# Patient Record
Sex: MALE | Race: WHITE
[De-identification: names, ages, dates, MRNs, and addresses within clinical notes are randomized per-mention and may not be internally consistent; named-entity substitution may affect disease eponyms.]

---

## 2018-03-19 ENCOUNTER — HOSPITAL ENCOUNTER (INPATIENT)
Dept: HOSPITAL 96 - M.ERS | Age: 63
LOS: 3 days | Discharge: HOME | DRG: 286 | End: 2018-03-22
Attending: INTERNAL MEDICINE | Admitting: INTERNAL MEDICINE
Payer: COMMERCIAL

## 2018-03-19 VITALS — BODY MASS INDEX: 38.5 KG/M2 | WEIGHT: 275 LBS | HEIGHT: 70.98 IN

## 2018-03-19 VITALS — DIASTOLIC BLOOD PRESSURE: 65 MMHG | SYSTOLIC BLOOD PRESSURE: 140 MMHG

## 2018-03-19 VITALS — SYSTOLIC BLOOD PRESSURE: 160 MMHG | DIASTOLIC BLOOD PRESSURE: 64 MMHG

## 2018-03-19 DIAGNOSIS — N18.2: ICD-10-CM

## 2018-03-19 DIAGNOSIS — Z95.1: ICD-10-CM

## 2018-03-19 DIAGNOSIS — I48.0: ICD-10-CM

## 2018-03-19 DIAGNOSIS — J44.9: ICD-10-CM

## 2018-03-19 DIAGNOSIS — I24.9: ICD-10-CM

## 2018-03-19 DIAGNOSIS — I25.5: ICD-10-CM

## 2018-03-19 DIAGNOSIS — I13.0: ICD-10-CM

## 2018-03-19 DIAGNOSIS — G47.33: ICD-10-CM

## 2018-03-19 DIAGNOSIS — I10: ICD-10-CM

## 2018-03-19 DIAGNOSIS — F17.210: ICD-10-CM

## 2018-03-19 DIAGNOSIS — F32.9: ICD-10-CM

## 2018-03-19 DIAGNOSIS — Z79.899: ICD-10-CM

## 2018-03-19 DIAGNOSIS — I50.33: ICD-10-CM

## 2018-03-19 DIAGNOSIS — I25.110: Primary | ICD-10-CM

## 2018-03-19 LAB
ABSOLUTE BASOPHILS: 0 THOU/UL (ref 0–0.2)
ABSOLUTE EOSINOPHILS: 0.1 THOU/UL (ref 0–0.7)
ABSOLUTE MONOCYTES: 0.3 THOU/UL (ref 0–1.2)
ALBUMIN SERPL-MCNC: 3.8 G/DL (ref 3.4–5)
ALP SERPL-CCNC: 63 U/L (ref 46–116)
ALT SERPL-CCNC: 33 U/L (ref 30–65)
ANION GAP SERPL CALC-SCNC: 8 MMOL/L (ref 7–16)
AST SERPL-CCNC: 21 U/L (ref 15–37)
BASOPHILS NFR BLD AUTO: 0.5 %
BILIRUB SERPL-MCNC: 0.3 MG/DL
BUN SERPL-MCNC: 19 MG/DL (ref 7–18)
CALCIUM SERPL-MCNC: 9.5 MG/DL (ref 8.5–10.1)
CHLORIDE SERPL-SCNC: 103 MMOL/L (ref 98–107)
CO2 SERPL-SCNC: 28 MMOL/L (ref 21–32)
CREAT SERPL-MCNC: 1.3 MG/DL (ref 0.6–1.3)
EOSINOPHIL NFR BLD: 1.6 %
GLUCOSE SERPL-MCNC: 248 MG/DL (ref 70–99)
GRANULOCYTES NFR BLD MANUAL: 78.1 %
HCT VFR BLD CALC: 43.5 % (ref 42–52)
HGB BLD-MCNC: 14.7 GM/DL (ref 14–18)
INR PPP: 1
LYMPHOCYTES # BLD: 1.4 THOU/UL (ref 0.8–5.3)
LYMPHOCYTES NFR BLD AUTO: 16.4 %
MCH RBC QN AUTO: 32.2 PG (ref 26–34)
MCHC RBC AUTO-ENTMCNC: 33.7 G/DL (ref 28–37)
MCV RBC: 95.5 FL (ref 80–100)
MONOCYTES NFR BLD: 3.4 %
MPV: 9.1 FL. (ref 7.2–11.1)
NEUTROPHILS # BLD: 6.7 THOU/UL (ref 1.6–8.1)
NT-PRO BRAIN NAT PEPTIDE: 70 PG/ML (ref ?–300)
NUCLEATED RBCS: 0 /100WBC
PLATELET COUNT*: 168 THOU/UL (ref 150–400)
POTASSIUM SERPL-SCNC: 3.6 MMOL/L (ref 3.5–5.1)
PROT SERPL-MCNC: 7.5 G/DL (ref 6.4–8.2)
PROTHROMBIN TIME: 10 SECONDS (ref 9.2–11.5)
RBC # BLD AUTO: 4.56 MIL/UL (ref 4.5–6)
RDW-CV: 14 % (ref 10.5–14.5)
SODIUM SERPL-SCNC: 139 MMOL/L (ref 136–145)
TROPONIN-I LEVEL: <0.06 NG/ML (ref ?–0.06)
WBC # BLD AUTO: 8.6 THOU/UL (ref 4–11)

## 2018-03-20 VITALS — SYSTOLIC BLOOD PRESSURE: 148 MMHG | DIASTOLIC BLOOD PRESSURE: 68 MMHG

## 2018-03-20 VITALS — SYSTOLIC BLOOD PRESSURE: 126 MMHG | DIASTOLIC BLOOD PRESSURE: 57 MMHG

## 2018-03-20 VITALS — SYSTOLIC BLOOD PRESSURE: 133 MMHG | DIASTOLIC BLOOD PRESSURE: 66 MMHG

## 2018-03-20 VITALS — DIASTOLIC BLOOD PRESSURE: 64 MMHG | SYSTOLIC BLOOD PRESSURE: 126 MMHG

## 2018-03-20 VITALS — SYSTOLIC BLOOD PRESSURE: 126 MMHG | DIASTOLIC BLOOD PRESSURE: 72 MMHG

## 2018-03-20 VITALS — DIASTOLIC BLOOD PRESSURE: 66 MMHG | SYSTOLIC BLOOD PRESSURE: 123 MMHG

## 2018-03-20 VITALS — SYSTOLIC BLOOD PRESSURE: 141 MMHG | DIASTOLIC BLOOD PRESSURE: 61 MMHG

## 2018-03-20 VITALS — DIASTOLIC BLOOD PRESSURE: 59 MMHG | SYSTOLIC BLOOD PRESSURE: 128 MMHG

## 2018-03-20 VITALS — SYSTOLIC BLOOD PRESSURE: 139 MMHG | DIASTOLIC BLOOD PRESSURE: 67 MMHG

## 2018-03-20 VITALS — SYSTOLIC BLOOD PRESSURE: 128 MMHG | DIASTOLIC BLOOD PRESSURE: 48 MMHG

## 2018-03-20 VITALS — SYSTOLIC BLOOD PRESSURE: 142 MMHG | DIASTOLIC BLOOD PRESSURE: 70 MMHG

## 2018-03-20 VITALS — SYSTOLIC BLOOD PRESSURE: 140 MMHG | DIASTOLIC BLOOD PRESSURE: 66 MMHG

## 2018-03-20 VITALS — SYSTOLIC BLOOD PRESSURE: 129 MMHG | DIASTOLIC BLOOD PRESSURE: 63 MMHG

## 2018-03-20 VITALS — DIASTOLIC BLOOD PRESSURE: 66 MMHG | SYSTOLIC BLOOD PRESSURE: 14 MMHG

## 2018-03-20 VITALS — SYSTOLIC BLOOD PRESSURE: 129 MMHG | DIASTOLIC BLOOD PRESSURE: 59 MMHG

## 2018-03-20 VITALS — SYSTOLIC BLOOD PRESSURE: 135 MMHG | DIASTOLIC BLOOD PRESSURE: 66 MMHG

## 2018-03-20 VITALS — SYSTOLIC BLOOD PRESSURE: 136 MMHG | DIASTOLIC BLOOD PRESSURE: 62 MMHG

## 2018-03-20 VITALS — SYSTOLIC BLOOD PRESSURE: 130 MMHG | DIASTOLIC BLOOD PRESSURE: 67 MMHG

## 2018-03-20 VITALS — SYSTOLIC BLOOD PRESSURE: 127 MMHG | DIASTOLIC BLOOD PRESSURE: 63 MMHG

## 2018-03-20 VITALS — SYSTOLIC BLOOD PRESSURE: 130 MMHG | DIASTOLIC BLOOD PRESSURE: 70 MMHG

## 2018-03-20 LAB
ALBUMIN SERPL-MCNC: 3.7 G/DL (ref 3.4–5)
ALP SERPL-CCNC: 58 U/L (ref 46–116)
ALT SERPL-CCNC: 27 U/L (ref 30–65)
ANION GAP SERPL CALC-SCNC: 13 MMOL/L (ref 7–16)
AST SERPL-CCNC: 21 U/L (ref 15–37)
BILIRUB SERPL-MCNC: 0.3 MG/DL
BUN SERPL-MCNC: 20 MG/DL (ref 7–18)
CALCIUM SERPL-MCNC: 9.2 MG/DL (ref 8.5–10.1)
CHLORIDE SERPL-SCNC: 110 MMOL/L (ref 98–107)
CHOLEST SERPL-MCNC: 158 MG/DL (ref ?–200)
CO2 SERPL-SCNC: 23 MMOL/L (ref 21–32)
CREAT SERPL-MCNC: 1.4 MG/DL (ref 0.6–1.3)
GLUCOSE SERPL-MCNC: 112 MG/DL (ref 70–99)
HCT VFR BLD CALC: 43.1 % (ref 42–52)
HDLC SERPL-MCNC: 44 MG/DL (ref 40–?)
HGB BLD-MCNC: 14.3 GM/DL (ref 14–18)
LDLC SERPL-MCNC: 80 MG/DL (ref ?–100)
MCH RBC QN AUTO: 31.8 PG (ref 26–34)
MCHC RBC AUTO-ENTMCNC: 33.1 G/DL (ref 28–37)
MCV RBC: 96 FL (ref 80–100)
MPV: 9.1 FL. (ref 7.2–11.1)
PLATELET COUNT*: 153 THOU/UL (ref 150–400)
POTASSIUM SERPL-SCNC: 4.1 MMOL/L (ref 3.5–5.1)
PROT SERPL-MCNC: 6.7 G/DL (ref 6.4–8.2)
RBC # BLD AUTO: 4.49 MIL/UL (ref 4.5–6)
RDW-CV: 13.8 % (ref 10.5–14.5)
SODIUM SERPL-SCNC: 146 MMOL/L (ref 136–145)
TC:HDL: 3.6 RATIO
TRIGL SERPL-MCNC: 173 MG/DL (ref ?–150)
VLDLC SERPL CALC-MCNC: 35 MG/DL (ref ?–40)
WBC # BLD AUTO: 8 THOU/UL (ref 4–11)

## 2018-03-20 NOTE — EKG
[place] [address]
Phone:  [phone]                     ELECTROCARDIOGRAM REPORT      
_______________________________________________________________________________
 
Name:       CHARLOTTEHERBER MATTI                Room:           [address]    ADM IN  
M.R.#:  [id number]      Account #:      [id number]  
Admission:  18     Attend Phys:    SITA Linn
Discharge:               Date of Birth:  [birth year]  
         Report #: [id number]
    [id number]
_______________________________________________________________________________
THIS REPORT FOR:  //name//                      
 
                         [facility] ED
                                       
Test Date:    2018               Test Time:    23:01:29
Pat Name:     HERBER PORTER            Department:   
Patient ID:   [id number]            Room:         [address]
Gender:       M                        Technician:   
:          1955               Requested By: Johny Enriquez
Order Number: [id number] MD:   Sony Farr
                                 Measurements
Intervals                              Axis          
Rate:         82                       P:            35
MA:           177                      QRS:          -22
QRSD:         106                      T:            58
QT:           385                                    
QTc:          450                                    
                           Interpretive Statements
Sinus rhythm
Borderline left axis deviation
Abnormal R-wave progression, early transition
Baseline wander in lead(s) V4
Compared to ECG 2018 21:12:48
No significant changes
 
Electronically Signed On 3- 15:09:02 CDT by Sony Farr
[email or web address]
 
 
 
 
 
 
 
 
 
 
 
 
 
 
 
 
  <ELECTRONICALLY SIGNED>
                                           By: Sony Farr MD, FAC   
  18     1509
D: 18 230   _____________________________________
T: 18 230   Sony Farr MD, FAC     /EPI

## 2018-03-20 NOTE — EKG
[place] [address]
Phone:  [phone]                     ELECTROCARDIOGRAM REPORT      
_______________________________________________________________________________
 
Name:       CHARLOTTEHERBER M                Room:           [address]    ADM IN  
M.R.#:  [id number]      Account #:      [id number]  
Admission:  18     Attend Phys:    SITA Linn
Discharge:               Date of Birth:  [birth year]  
         Report #: [id number]
    [id number]
_______________________________________________________________________________
THIS REPORT FOR:  //name//                      
 
                         [facility] ED
                                       
Test Date:    2018               Test Time:    21:12:48
Pat Name:     HERBER PORTER            Department:   
Patient ID:   [id number]            Room:         [facility]
Gender:       M                        Technician:   
:          1955               Requested By: Claudette Burciaga
Order Number: [id number] MD:   Best Lange
                                 Measurements
Intervals                              Axis          
Rate:         85                       P:            87
OR:           187                      QRS:          -3
QRSD:         108                      T:            76
QT:           368                                    
QTc:          438                                    
                           Interpretive Statements
Sinus rhythm
Abnormal R-wave progression, early transition
Baseline wander in lead(s) V6
No previous ECG available for comparison
 
Electronically Signed On 3- 10:47:36 CDT by Best Lange
[email or web address]
 
 
 
 
 
 
 
 
 
 
 
 
 
 
 
 
 
 
  <ELECTRONICALLY SIGNED>
                                           By: Best Lange MD, [facility]      
  18     1047
D: 18   _____________________________________
T: 18   Best Lange MD, FAC        /EPI

## 2018-03-20 NOTE — 2DMMODE
[place] [address]
Phone:  [phone] 2 D/M-MODE ECHOCARDIOGRAM     
_______________________________________________________________________________
 
Name:         CHARLOTTEHERBER MATTI               Room:          008-P    [facility] [place] 
[facility]#:    [id number]     Account #:     [id number]  
Admission:    18    Attend Phys:   Johny Enriquez
Discharge:                Date of Birth: [birth year]  
Date of Service: 18 1434  Report #:      [id number]
        [id number]
_______________________________________________________________________________
THIS REPORT FOR:  //name//                      
 
 
--------------- APPROVED REPORT --------------
 
 
Study performed:  2018 11:15:02
 
EXAM: Comprehensive 2D, Doppler, and color-flow 
Echocardiogram 
Patient Location: In-Patient   
Room #:  008     Status:  routine
 
      BSA:         2.36
HR: 75 bpm BP:          134/67 mmHg 
Rhythm: NSR     
 
Other Information 
Study Quality: Good
 
Indications
Chest Pain
 
2D Dimensions
   LVEF(%):  68.53 (&gt;50%)
IVSd:  12.53 (7-11mm) LVOT Diam:  22.02 (18-24mm) 
LVDd:  46.40 mm  
PWd:  11.23 (7-11mm) Ascending Ao:  42.37 (22-36mm)
LVDs:  28.65 (25-40mm) 
Aortic Root:  30.44 mm 
   Saucedo's LVEF:  68.53 %
 
Volumes
Left Atrial Volume (Systole) 
    LA ESV Index:  35.30 mL/m2
 
Aortic Valve
AoV Peak Denver.:  1.27 m/s 
AO Peak Gr.:  6.46 mmHg  LVOT Max P.90 mmHg
AO Mean Gr.:  3.46 mmHg  LVOT Mean P.42 mmHg
    LVOT Max V:  1.11 m/s
AO V2 VTI:  26.84 cm  LVOT Mean V:  0.72 m/s
CAROLYN (VTI):  4.02 cm2  LVOT V1 VTI:  28.35 cm
 
Mitral Valve
    E/A Ratio:  1.44
 
 
[place] [address]
Phone:  [phone]                     2 D/M-MODE ECHOCARDIOGRAM     
_______________________________________________________________________________
 
Name:         CHARLOTTEHERBER               Room:          [address] [place] 
.R.#:    [id number]     Account #:     [id number]  
Admission:    18    Attend Phys:   Johny Enriquez
Discharge:                Date of Birth: [birth year]  
Date of Service: 18 1434  Report #:      [id number]
        [id number]
_______________________________________________________________________________
    MV Decel. Time:  178.19 ms
MV E Max Denver.:  0.93 m/s 
MV PHT:  51.67 ms  
MVA (PHT):  4.26 cm2  
 
TDI
E/Lateral E':  5.17 E/Medial E':  7.15
   Medial E' Denver.:  0.13 m/s
   Lateral E' Denver.:  0.18 m/s
 
Pulmonary Valve
PV Peak Denver.:  1.48 m/s PV Peak Gr.:  8.75 mmHg
 
Tricuspid Valve
TR Peak Gr.:  33.56 mmHg RVSP:  38.00 mmHg
 
Left Ventricle
The left ventricle is normal size. very mild inferolateral 
hypokinesis, poor endocardial definition, but otherwise there is 
normal LV segmental wall motion There is normal left ventricular wall 
thickness. Left ventricular systolic function is normal. The left 
ventricular ejection fraction is within the normal range. LVEF is 
55-60%. The left ventricular diastolic function is normal.
 
Right Ventricle
The right ventricle is normal size. The right ventricular systolic 
function is normal.
 
Atria
The left atrium size is normal. Right atrium is at the upper limits 
of normal.
 
Aortic Valve
The aortic valve is normal in structure. No aortic regurgitation is 
present. There is no aortic valvular stenosis.
 
Mitral Valve
The mitral valve is normal in structure. Trace mitral regurgitation. 
No evidence of mitral valve stenosis.
 
Tricuspid Valve
The tricuspid valve is normal in structure. Trace tricuspid 
regurgitation. The RVSP is 35-40 mmHg.
 
Pulmonic Valve
The pulmonary valve is normal in structure. There is no pulmonic 
 
 
[place] [address]
Phone:  [phone]                     2 D/M-MODE ECHOCARDIOGRAM     
_______________________________________________________________________________
 
Name:         CHARLOTTERADHAHERBER MATTI               Room:          [address] [place] 
M.R.#:    [id number]     Account #:     [id number]  
Admission:    18    Attend Phys:   Johny Enriquez
Discharge:                Date of Birth: [birth year]  
Date of Service: 18 1434  Report #:      [id number]
        [id number]
_______________________________________________________________________________
valvular regurgitation.
 
Great Vessels
The aortic root is normal in size. IVC is normal in size and 
collapses with &gt;50% inspiration
 
Pericardium
There is no pericardial effusion.
 
&lt;Conclusion&gt;
LVEF is 55-60%.
very mild inferolateral hypokinesis, poor endocardial definition, but 
otherwise there is normal LV segmental wall motion
There is no aortic valvular stenosis.
No aortic regurgitation is present.
Trace mitral regurgitation.
Trace tricuspid regurgitation.
The RVSP is 35-40 mmHg.
 
 
 
 
 
 
 
 
 
 
 
 
 
 
 
 
 
 
 
 
 
 
 
 
 
 
  <ELECTRONICALLY SIGNED>
                                           By: Sony Farr MD, FACC   
  18     1434
D: 18 1434   _____________________________________
T: 18 1434   Sony Farr MD, FACC     /INF

## 2018-03-21 VITALS — SYSTOLIC BLOOD PRESSURE: 131 MMHG | DIASTOLIC BLOOD PRESSURE: 65 MMHG

## 2018-03-21 VITALS — DIASTOLIC BLOOD PRESSURE: 75 MMHG | SYSTOLIC BLOOD PRESSURE: 130 MMHG

## 2018-03-21 VITALS — DIASTOLIC BLOOD PRESSURE: 69 MMHG | SYSTOLIC BLOOD PRESSURE: 133 MMHG

## 2018-03-21 VITALS — DIASTOLIC BLOOD PRESSURE: 58 MMHG | SYSTOLIC BLOOD PRESSURE: 104 MMHG

## 2018-03-21 VITALS — DIASTOLIC BLOOD PRESSURE: 67 MMHG | SYSTOLIC BLOOD PRESSURE: 143 MMHG

## 2018-03-21 VITALS — DIASTOLIC BLOOD PRESSURE: 64 MMHG | SYSTOLIC BLOOD PRESSURE: 145 MMHG

## 2018-03-21 VITALS — SYSTOLIC BLOOD PRESSURE: 129 MMHG | DIASTOLIC BLOOD PRESSURE: 69 MMHG

## 2018-03-21 VITALS — SYSTOLIC BLOOD PRESSURE: 129 MMHG | DIASTOLIC BLOOD PRESSURE: 75 MMHG

## 2018-03-21 VITALS — SYSTOLIC BLOOD PRESSURE: 131 MMHG | DIASTOLIC BLOOD PRESSURE: 71 MMHG

## 2018-03-21 VITALS — DIASTOLIC BLOOD PRESSURE: 69 MMHG | SYSTOLIC BLOOD PRESSURE: 132 MMHG

## 2018-03-21 VITALS — DIASTOLIC BLOOD PRESSURE: 68 MMHG | SYSTOLIC BLOOD PRESSURE: 122 MMHG

## 2018-03-21 VITALS — SYSTOLIC BLOOD PRESSURE: 128 MMHG | DIASTOLIC BLOOD PRESSURE: 71 MMHG

## 2018-03-21 VITALS — SYSTOLIC BLOOD PRESSURE: 150 MMHG | DIASTOLIC BLOOD PRESSURE: 65 MMHG

## 2018-03-21 VITALS — SYSTOLIC BLOOD PRESSURE: 137 MMHG | DIASTOLIC BLOOD PRESSURE: 63 MMHG

## 2018-03-21 VITALS — DIASTOLIC BLOOD PRESSURE: 67 MMHG | SYSTOLIC BLOOD PRESSURE: 148 MMHG

## 2018-03-21 VITALS — SYSTOLIC BLOOD PRESSURE: 135 MMHG | DIASTOLIC BLOOD PRESSURE: 69 MMHG

## 2018-03-21 VITALS — DIASTOLIC BLOOD PRESSURE: 67 MMHG | SYSTOLIC BLOOD PRESSURE: 122 MMHG

## 2018-03-21 VITALS — DIASTOLIC BLOOD PRESSURE: 69 MMHG | SYSTOLIC BLOOD PRESSURE: 127 MMHG

## 2018-03-21 VITALS — SYSTOLIC BLOOD PRESSURE: 134 MMHG | DIASTOLIC BLOOD PRESSURE: 72 MMHG

## 2018-03-21 LAB
ANION GAP SERPL CALC-SCNC: 9 MMOL/L (ref 7–16)
BUN SERPL-MCNC: 16 MG/DL (ref 7–18)
CALCIUM SERPL-MCNC: 9.3 MG/DL (ref 8.5–10.1)
CHLORIDE SERPL-SCNC: 110 MMOL/L (ref 98–107)
CO2 SERPL-SCNC: 25 MMOL/L (ref 21–32)
CREAT SERPL-MCNC: 1.3 MG/DL (ref 0.6–1.3)
EST. AVERAGE GLUCOSE BLD GHB EST-MCNC: 97 MG/DL
GLUCOSE SERPL-MCNC: 134 MG/DL (ref 70–99)
GLYCOHEMOGLOBIN (HGB A1C): 5 % (ref 4.8–5.6)
HCT VFR BLD CALC: 38.3 % (ref 42–52)
HGB BLD-MCNC: 12.9 GM/DL (ref 14–18)
INR PPP: 1.1
MAGNESIUM SERPL-MCNC: 2.2 MG/DL (ref 1.8–2.4)
MCH RBC QN AUTO: 32.2 PG (ref 26–34)
MCHC RBC AUTO-ENTMCNC: 33.6 G/DL (ref 28–37)
MCV RBC: 95.6 FL (ref 80–100)
MPV: 8.7 FL. (ref 7.2–11.1)
PLATELET COUNT*: 134 THOU/UL (ref 150–400)
POTASSIUM SERPL-SCNC: 4.8 MMOL/L (ref 3.5–5.1)
PROTHROMBIN TIME: 10.5 SECONDS (ref 9.2–11.5)
RBC # BLD AUTO: 4.01 MIL/UL (ref 4.5–6)
RDW-CV: 13.8 % (ref 10.5–14.5)
SODIUM SERPL-SCNC: 144 MMOL/L (ref 136–145)
WBC # BLD AUTO: 7 THOU/UL (ref 4–11)

## 2018-03-21 PROCEDURE — B211YZZ FLUOROSCOPY OF MULTIPLE CORONARY ARTERIES USING OTHER CONTRAST: ICD-10-PCS | Performed by: INTERNAL MEDICINE

## 2018-03-21 PROCEDURE — B215YZZ FLUOROSCOPY OF LEFT HEART USING OTHER CONTRAST: ICD-10-PCS | Performed by: INTERNAL MEDICINE

## 2018-03-21 PROCEDURE — 4A023N7 MEASUREMENT OF CARDIAC SAMPLING AND PRESSURE, LEFT HEART, PERCUTANEOUS APPROACH: ICD-10-PCS | Performed by: INTERNAL MEDICINE

## 2018-03-21 PROCEDURE — B213YZZ FLUOROSCOPY OF MULTIPLE CORONARY ARTERY BYPASS GRAFTS USING OTHER CONTRAST: ICD-10-PCS | Performed by: INTERNAL MEDICINE

## 2018-03-21 NOTE — CARD
[address]  [address]                    CARDIAC CATH REPORT           
_______________________________________________________________________________
 
Name:       HERBER PORTER                Room:           [address] [place]  
..#:  [id number]      Account #:      [id number]  
Admission:  03/19/18     Attend Phys:    SITA Linn
Discharge:               Date of Birth:  [birth year]  
         Report #: [id number]
                                                                     [id number]
_______________________________________________________________________________
THIS REPORT FOR:  //name//                      
 
 
--------------- APPROVED REPORT --------------
 
 
Study performed:  03/21/2018 14:50:04
 
Patient Details
Patient Status: In-Patient                  Room #: 
The patient is a 63 year-old male
 
Event Personnel
Best Lange  Cardiologist, Ruth Hitchcock RN Circulator, Nika Mccloud, Denys Hood (R) Scrub
 
Procedures Performed
cathArt Access - R femoral artery*  Left Heart Cath Coronaries, 
Bypass Grafts [id number] [facility] Supravalv[facility] Injection 
[id number] ISVA , Right transradial approach
 
Indication
Dyspnea, Unstable angina 
 
Risk Factors
Arterial Hypertension, Hypercholesterolemia, Coronary Artery Disease, 
Diabetes 
 
Previous Procedures/Diagnoses
Previous CABGPrevious PCI
 
Procedure Narrative
The patient was brought electively to the Cardiac Catheterization 
Laboratory and was prepped and draped in a sterile manner. The right 
femoral was infiltrated with 1% Lidocaine subcutaneous anesthesia. A 
6 fr sheath was inserted into the right femoral artery. Coronary 
angiography was performed using coronary diagnostic catheters. The 
right coronary system was accessed and visualized with a Diagnostic 
catheter. The left coronary system was accessed and visualized with a 
Diagnostic catheter. The left ventricle was accessed and visualized 
with a Diagnostic catheter. Left ventricular/Aortic Valve gradient 
assessed via catheter pullback. Left ventriculogram was performed in 
HERNÁNDEZ projection. An aortogram of the ascending aorta was performed. 
Pre-demployment femoral angiogram was performed . Closure device was 
deployed with a 6 Fr Mynx. The patient tolerated the procedure well 
and there were no complications associated with the procedure. There 
 
 
 
[place] [address]                    CARDIAC CATH REPORT           
_______________________________________________________________________________
 
Name:       CHARLOTTEHERBER MATTI                Room:           [address] [place]  
..#:  [id number]      Account #:      [id number]  
Admission:  03/19/18     Attend Phys:    SITA Linn
Discharge:               Date of Birth:  [birth year]  
         Report #: [id number]
                                                                     [id number]
_______________________________________________________________________________
was no hematoma. Jr4 catheter used to visualize the svg to rca. SVG 
to circumflex previously had been noted to be occluded. A stump could 
not be identified during this study. LIMA catheter was used for the 
LIMA graft, however, because of tortuosity, the ostium could not be 
cannulated. A flush injection of the LIMA graft was performed. An 
aortic root injection was performed with a pigtail 
catheter.
 
Intraoperative Conscious Sedation
Sedation start time:  1535           Case end Time:  1618   
 
Dose:        1846 mGy  
Contrast Type and Amount:  Visipaque 310 ml    
 
Coronary Angiography
The patient's coronary anatomy is co- dominant. 
 
Native Artery Percent Stenosis
Grafts (Complete if Previous CABG=Yes: Percent Stenosis)
SVG to distal RCA was patent.  SVG to CX could not be identified.  
Flush injection of LIMA graft showed the graft to be 
patent.
 
Diagnostic Cath
Left Main Stent in distal left main extended into circumflex and was 
patent
LAD  90% ostial stenosis, and 60% after the 1st diagonal
Diagonal 1 50% mid stenosis
Circumflex stent extending from the left main was patent.
OM2  60% ostial stenosis
L PDA  noted to be a medium sized vessel and had a 99% mid 
stenosis
Right Coronary stent noted proximally and rca appeared to be 
chronically occluded just beyond the stent
 
Left Ventriculography
The left ventricle is normal in size with normal contractility. The 
left ventricular ejection fraction is estimated to be 55-60%. Left 
ventricular wall motion abnormalities are not present. There is no 
mitral insufficiency. aortic root injection showed no aortic 
insufficiency, and only one patent vein graft
 
Hemodynamics
The aortic pressure is 159/75 mmHg with a mean of 84 mmHg. The left 
ventricular pressure is 166/12 mmHg with a mean of mmHg. The left 
ventricular end diastolic pressure is 30 mmHg. There was no gradient 
 
 
 
[place] [address]                    CARDIAC CATH REPORT           
_______________________________________________________________________________
 
Name:       CHARLOTTEHERBER                Room:           [address] [place]  
M.R.#:  [id number]      Account #:      [id number]  
Admission:  03/19/18     Attend Phys:    SITA Linn
Discharge:               Date of Birth:  [birth year]  
         Report #: [id number]
                                                                     [id number]
_______________________________________________________________________________
across the aortic valve upon pullback. Pullback from the left 
ventricle to the aorta revealed no gradient across the aortic 
valve.
 
Conclusion
1.  patent stent in the left main artery that extended into 
circumflex artery
2.  SVG to circumflex appeared to be occluded
3.  patent lima graft to the lad
4.  patent svg to rca
5.  99% stenosis of a medium sized ROCIO branch of the distal 
circumflex 
 
Recommendations
consider stenting of the small ROCIO branch for angina refractory to 
medical therapy
 
 
 
 
 
 
 
 
 
 
 
 
 
 
 
 
 
 
 
 
 
 
 
 
 
 
 
 
<ELECTRONICALLY SIGNED>
                                        By:  Best Lange MD, [facility]      
03/21/18     1737
D: 03/21/18 1737_______________________________________
T: 03/21/18 1737Darosalind Lange MD, [facility]         /INF

## 2018-03-22 VITALS — SYSTOLIC BLOOD PRESSURE: 148 MMHG | DIASTOLIC BLOOD PRESSURE: 76 MMHG

## 2018-03-22 VITALS — DIASTOLIC BLOOD PRESSURE: 56 MMHG | SYSTOLIC BLOOD PRESSURE: 129 MMHG

## 2018-03-22 VITALS — SYSTOLIC BLOOD PRESSURE: 129 MMHG | DIASTOLIC BLOOD PRESSURE: 56 MMHG

## 2018-03-22 LAB
ANION GAP SERPL CALC-SCNC: 8 MMOL/L (ref 7–16)
BUN SERPL-MCNC: 13 MG/DL (ref 7–18)
CALCIUM SERPL-MCNC: 8.9 MG/DL (ref 8.5–10.1)
CHLORIDE SERPL-SCNC: 107 MMOL/L (ref 98–107)
CO2 SERPL-SCNC: 27 MMOL/L (ref 21–32)
CREAT SERPL-MCNC: 1.2 MG/DL (ref 0.6–1.3)
GLUCOSE SERPL-MCNC: 116 MG/DL (ref 70–99)
POTASSIUM SERPL-SCNC: 4.2 MMOL/L (ref 3.5–5.1)
SODIUM SERPL-SCNC: 142 MMOL/L (ref 136–145)

## 2018-03-22 NOTE — CON
[address]  [address]                    CONSULTATION                  
_______________________________________________________________________________
 
Name:       HERBER PORTER                Room:           [address] [place]  
M.R.#:  [id number]      Account #:      [id number]  
Admission:  03/19/18     Attend Phys:    SITA Linn
Discharge:               Date of Birth:  [birth year]  
         Report #: [id number]
                                                                     [id number]  
_______________________________________________________________________________
THIS REPORT FOR:  //name//                      
 
CC: Anil Enriquez
 
PRIMARY CARDIOLOGIST:  Dr. Wilhelm, [place].
 
PCP:  Dr. Anil Conway.
 
CHIEF COMPLAINT:  Chest pain and shortness of breath.
 
HISTORY OF PRESENT ILLNESS:  The patient is a 63-year-old man with a history of
multivessel coronary artery disease and atrial fibrillation and respiratory
disease, who presents with acute onset of chest discomfort yesterday.  It
occurred at rest.  It was associated with shortness of breath.  In the emergency
room, his ECG did not show any acute ST segment abnormalities.  It was severe
enough that he had taken nitroglycerin without relief after 2 nitro.  His
symptoms of chest discomfort were left-sided and nonradiating.  He was started
on IV heparin and nitroglycerin drips and his symptoms have resolved for the
most part overnight.
 
The patient has a history of atrial arrhythmias and is anticoagulated with both
Plavix and a novel agent and he did take his Eliquis dose yesterday morning.  It
was held overnight.  Presently, he is in sinus rhythm.
 
He denies chest pain this morning.  He has been short of breath, but this is
more chronic.  The patient has a history of heavy tobacco use.  In the emergency
room, an x-ray did demonstrate a small pleural effusion and possible pneumonic
infiltrate.  He admits he had been coughing for several days, possibly a month. 
He is an active smoker.
 
From a cardiovascular standpoint, he is on 2 anticoagulants, Plavix and Eliquis,
and he has been compliant with them.
 
PAST MEDICAL HISTORY:  Significant for the following:  Records from Dr. Wilhelm,
his usual cardiologist were obtained overnight and his last cardiac
catheterization was performed approximately 6 months ago and also in 09/2017.  I
was able to obtain the September study, which demonstrated a patent BURRIS to LAD
and patent vein graft to PDA.  The vein graft to circumflex is occluded.  He had
a history of bypass in 2010.  He had PCI prior to this.  He had a PCI of the
left main and circumflex with drug-eluting stents.  They are both 38 mm long. 
He has a history of DVT with IVC filter, has history of atrial arrhythmias.  He
has sleep apnea, COPD.  He has an abdominal aortic aneurysm, has a history of
MRSA, ARMIDA, cervical radiculitis, cervical spinal stenosis, chronic back pain,
and anxiety.
 
 
 
 
[place] [address]                    CONSULTATION                  
_______________________________________________________________________________
 
Name:       CHARLOTTERADHAHERBER MATTI                Room:           [address] [place]  
M.R.#:  [id number]      Account #:      [id number]  
Admission:  03/19/18     Attend Phys:    SITA Linn
Discharge:               Date of Birth:  [birth year]  
         Report #: [id number]
                                                                     [id number]  
_______________________________________________________________________________
ALLERGIES:  NEOPRENE, but no known drug allergies to contrast dye or aspirin.
 
SOCIAL HISTORY:  He is an everyday smoker, has done so for about 45 years. 
Drinks once or twice per week.
 
FAMILY HISTORY:  Positive for multiple sclerosis.
 
PAST SURGICAL HISTORY:  CABG, PCI, esophageal dilatation.
 
MEDICATIONS:  Plavix 75 mg daily, nitroglycerin, Protonix, ranitidine, Spiriva,
Eliquis 5 mg twice daily, Lasix 20 mg daily, Imdur 30 mg daily, potassium
chloride 10 mEq daily, pravastatin 80 mg daily, gabapentin 300 mg daily,
Flexeril,  metoclopramide.
 
REVIEW OF SYSTEMS:
PULMONARY:  Positive dyspnea with exertion, positive cough.
CARDIOVASCULAR:  Positive chest discomfort, no palpitations.
ALLERGIES:  No aspirin or contrast allergies.
PSYCHIATRIC:  Positive depression.  Positive anxiety.
MUSCULOSKELETAL:  Positive arthritis.
SKIN:  No rashes.
EARS, NOSE, THROAT AND MOUTH:  No decreased hearing.  No bleeding from nose,
positive dentures.
GASTROINTESTINAL:  No hematemesis or melena.  No dysphagia.  No black colored
stools.
NEUROLOGIC:  No syncope or seizures.  Denies slurred speech, numbness or
weakness.
 
PHYSICAL EXAMINATION:
VITAL SIGNS:  Blood pressure is 126/57, pulse is 84, and temperature is 36.9.
GENERAL:  This is a pleasant, moderately obese, middle-aged male.  He is
pleasant, in no apparent distress.  He is resting comfortably with a CPAP mask
on.
HEENT:  Eyes:  EOMs are intact.  No facial asymmetry.
NECK:  Supple.  No jugular venous distention.
CARDIOVASCULAR:  Regular.  I cannot hear a rub, murmur or gallop.
LUNGS:  Diminished breath sounds bilaterally.
ABDOMEN:  Soft and nontender.
EXTREMITIES:  No peripheral edema.  
 
EKG from [place] showed a sinus rhythm with subtle inferior ST-segment
depression, lot of artifact though.  Our ECG from the Emergency Department
showed a sinus rhythm with resolution of the ST-segment changes and normal ST-T
wave segments in the sinus rhythm.
 
LABORATORY DATA:  Her hemoglobin is _____, white blood count is 8.0, platelet
 
 
 
[facility] 
[address] [place]  [address]                    CONSULTATION                  
_______________________________________________________________________________
 
Name:       HERBER PORTER                Room:           [address] [place]  
[facility].#:  [id number]      Account #:      [id number]  
Admission:  03/19/18     Attend Phys:    SITA Linn
Discharge:               Date of Birth:  [birth year]  
         Report #: [id number]
                                                                     [id number]  
_______________________________________________________________________________
count 153,000.  Sodium is 146, potassium 4.1, chloride is 110, BUN is 20,
creatinine is 1.4.  AST is 27.8, ALT is 21.  Troponin I is 0.06 x 3 sets. 
NT-proBNP is 70.  Hemoglobin is _____.  Chest x-ray reveals mild heterogeneous
peripheral left lower lobe opacities, could represent pneumonia.  There is also
blunting of left costophrenic angle and small pleural effusion.  Followup x-ray
in 4-6 weeks is recommended.
 
IMPRESSION:
1.  Unstable angina.  There were very subtle EKG changes on his transfer
electrocardiogram from the Emergency Department, but presenting ECG in our
Emergency Department in the ICU, did not show any dynamic ST segment
abnormalities.  He has been compliant with his antiplatelet therapy, Plavix. 
Given that his symptoms occurred at rest and unrelieved with nitroglycerin and
were relieved with an IV nitroglycerin drip and heparin, I think he should be
studied with a coronary arteriogram.  However, he had been on his Eliquis less
than 24 hours ago and unless his clinical status changes, I would prefer to wait
another 24 hours before proceeding with a coronary arteriogram.  In the
meantime, we will continue with aspirin, heparin and Plavix and hold his
Eliquis.
2.  Paroxysmal atrial fibrillation, presently he is in a sinus rhythm.  We will
continue with sotalol.  We will resume anticoagulation post-procedurally.
3.  Coronary artery disease, status post CABG.  As noted above, he has 2 of his
3 grafts known to be patent, has LIMA to LAD and the vein graft to the PDA.
4.  Status post PCI, we were able to obtain reports from September cardiac
catheterization; at that time, the stents in the left main and circumflex were
patent.
5.  Tobacco use.  Cessation was strongly recommended.
6.  An abnormal chest x-ray, cough.  This may represent pneumonia.  I will defer
it to our hospital colleagues regarding antibiotics.
7.  Oral anticoagulation; as noted above, we have held his Eliquis.
8.  Renal insufficiency.  This is very mild.  We will start him on IV fluids
prior to the cardiac catheterization and we will monitor this post-procedurally.
9.  History of DVT; as noted above, we will resume his anticoagulation as soon
as possible.
10.  History of descending thoracic aortic aneurysm, apparently it measured 3.4
cm.  We will defer as this surrounds to his usual cardiologist.
 
 
 
 
 
 
 
 
<ELECTRONICALLY SIGNED>
                                        By:  Sony Farr MD, FACC   
03/22/18     1056
D: 03/20/18 1027_______________________________________
T: 03/20/18 1119Sony Farr MD, FACC      /nt